# Patient Record
Sex: MALE | Race: OTHER | Employment: FULL TIME | URBAN - METROPOLITAN AREA
[De-identification: names, ages, dates, MRNs, and addresses within clinical notes are randomized per-mention and may not be internally consistent; named-entity substitution may affect disease eponyms.]

---

## 2020-08-12 ENCOUNTER — HOSPITAL ENCOUNTER (EMERGENCY)
Facility: HOSPITAL | Age: 55
Discharge: HOME | End: 2020-08-12
Attending: EMERGENCY MEDICINE
Payer: OTHER MISCELLANEOUS

## 2020-08-12 ENCOUNTER — APPOINTMENT (OUTPATIENT)
Dept: GENERAL RADIOLOGY | Facility: HOSPITAL | Age: 55
End: 2020-08-12
Attending: EMERGENCY MEDICINE
Payer: OTHER MISCELLANEOUS

## 2020-08-12 VITALS
BODY MASS INDEX: 19.78 KG/M2 | OXYGEN SATURATION: 100 % | HEART RATE: 66 BPM | TEMPERATURE: 98 F | RESPIRATION RATE: 18 BRPM | SYSTOLIC BLOOD PRESSURE: 122 MMHG | DIASTOLIC BLOOD PRESSURE: 84 MMHG | HEIGHT: 67 IN | WEIGHT: 126 LBS

## 2020-08-12 DIAGNOSIS — S61.309A TRAUMATIC AVULSION OF NAIL PLATE OF FINGER, INITIAL ENCOUNTER: Primary | ICD-10-CM

## 2020-08-12 DIAGNOSIS — S62.634A CLOSED DISPLACED FRACTURE OF DISTAL PHALANX OF RIGHT RING FINGER, INITIAL ENCOUNTER: ICD-10-CM

## 2020-08-12 PROCEDURE — 26750 TREAT FINGER FRACTURE EACH: CPT

## 2020-08-12 PROCEDURE — 73140 X-RAY EXAM OF FINGER(S): CPT | Performed by: EMERGENCY MEDICINE

## 2020-08-12 PROCEDURE — 99283 EMERGENCY DEPT VISIT LOW MDM: CPT

## 2020-08-12 PROCEDURE — 90471 IMMUNIZATION ADMIN: CPT

## 2020-08-12 NOTE — ED PROVIDER NOTES
Patient Seen in: [facility] Emergency Department      History   Patient presents with:  Upper Extremity Injury    Stated Complaint: sent from [facility] for R hand injury     HPI    48-year-old male presents emergency room from [facility] digits. SKIN: Warm, dry, intact, no rashes right upper extremity. NEUROLOGIC EXAM: Sensation intact all digits    ED Course   Labs Reviewed - No data to display       We will check x-ray rule out fracture.     Digital block performed to the right fourth d

## 2020-08-12 NOTE — ED INITIAL ASSESSMENT (HPI)
PT TO ED FROM [facility] FOR WORK INJURY. PT WAS MOVING SEAT ON A LANDSCAPING MOWER AND SEAT SLIPPED RIPPING NAIL OUT OF NAIL BED ON RIGHT HAND RING FINGER.